# Patient Record
Sex: FEMALE | Race: ASIAN | Employment: FULL TIME | URBAN - METROPOLITAN AREA
[De-identification: names, ages, dates, MRNs, and addresses within clinical notes are randomized per-mention and may not be internally consistent; named-entity substitution may affect disease eponyms.]

---

## 2017-03-13 ENCOUNTER — HOSPITAL ENCOUNTER (OUTPATIENT)
Dept: LAB | Age: 42
Discharge: HOME | End: 2017-03-13
Payer: COMMERCIAL

## 2017-03-13 ENCOUNTER — OFFICE VISIT (OUTPATIENT)
Dept: ONCOLOGY | Age: 42
End: 2017-03-13

## 2017-03-13 VITALS
SYSTOLIC BLOOD PRESSURE: 137 MMHG | OXYGEN SATURATION: 95 % | TEMPERATURE: 97.1 F | WEIGHT: 133.4 LBS | HEIGHT: 63 IN | HEART RATE: 72 BPM | BODY MASS INDEX: 23.64 KG/M2 | DIASTOLIC BLOOD PRESSURE: 87 MMHG | RESPIRATION RATE: 16 BRPM

## 2017-03-13 DIAGNOSIS — R39.15 URINARY URGENCY: ICD-10-CM

## 2017-03-13 DIAGNOSIS — R10.2 PELVIC PRESSURE IN FEMALE: ICD-10-CM

## 2017-03-13 DIAGNOSIS — C53.9: Primary | ICD-10-CM

## 2017-03-13 LAB
BILIRUB UR QL STRIP: NEGATIVE
GLUCOSE UR-MCNC: NEGATIVE MG/DL
KETONES P FAST UR STRIP-MCNC: NEGATIVE MG/DL
PH UR STRIP: 7 [PH] (ref 4.6–8)
PROT UR QL STRIP: NEGATIVE MG/DL
SP GR UR STRIP: 1.02 (ref 1–1.03)
UA UROBILINOGEN AMB POC: NORMAL (ref 0.2–1)
URINALYSIS CLARITY POC: CLEAR
URINALYSIS COLOR POC: YELLOW
URINE BLOOD POC: NEGATIVE
URINE LEUKOCYTES POC: NEGATIVE
URINE NITRITES POC: NEGATIVE

## 2017-03-13 PROCEDURE — 88175 CYTOPATH C/V AUTO FLUID REDO: CPT | Performed by: OBSTETRICS & GYNECOLOGY

## 2017-03-13 NOTE — PROGRESS NOTES
[facility]  [facility], [address], [address] [facility]   [phone]  Nidia Garcia MD      Patient ID:  Name: Jaime Cassidy  MRN: [id number]  : 1975/41 y.o. Visit date: 3/13/2017    INTERVAL HISTORY: INTERVAL HISTORY: Jaime Cassidy is a  female with a history of cervical cancer diagnosed 2011. Her previous procedures include radical hysterectomy including lymph node dissection and pathology has shown Cervical Cancer Stage 1B. She is being seen today for a 6 month followup. Last pap 3/11/16 and was satis and neg. Last PET/CT 12. Patient states that when she urinates she feels like she's not completley emptying. She noticed these symptoms approximately one month ago. Patient is having normal bowel movements with no vaginal bleeding or discharge. Mammogram        COMPREHENSIVE ROS:        PMH:  Past Medical History:   Diagnosis Date    Adenocarcinoma of cervix, stage 1 ([address]     Depression     HPV in female      PSH:  Past Surgical History:   Procedure Laterality Date    HX HYSTERECTOMY  11    radical for stage 1-B Grade I adenocarcinoma of cervix     SOC:  Social History     Social History    Marital status:      Spouse name: N/A    Number of children: N/A    Years of education: N/A     Occupational History    Not on file.      Social History Main Topics    Smoking status: Never Smoker    Smokeless tobacco: Not on file    Alcohol use 0.0 oz/week      Comment: weekly    Drug use: No    Sexual activity: Yes     Birth control/ protection: None     Other Topics Concern    Not on file     Social History Narrative     Family History  Family History   Problem Relation Age of Onset    Stroke Maternal Grandmother     Diabetes Paternal Grandmother     Stroke Paternal Grandmother     Heart Disease Paternal Grandfather      Medications:  Current Outpatient Prescriptions on File Prior to Visit   Medication Sig Dispense Refill    lamoTRIgine (LAMICTAL) 100 mg tablet Take  by mouth daily.  vilazodone (VIIBRYD) 40 mg Tab tablet Take 40 mg by mouth daily.  melatonin (MELATONIN) 5 mg cap capsule Take 5 mg by mouth nightly. No current facility-administered medications on file prior to visit. Allergies   Allergen Reactions    Amoxicillin Rash    Penicillins Rash         OBJECTIVE:  PHYSICAL EXAM  VITAL SIGNS: Visit Vitals    /87 (BP 1 Location: Right arm, BP Patient Position: Sitting)    Pulse 72    Temp 97.1 °F (36.2 °C) (Oral)    Resp 16    Ht 5' 3\" (1.6 m)    Wt 60.5 kg (133 lb 6.4 oz)    SpO2 95%    BMI 23.63 kg/m2      GENERAL HARESH: in no apparent distress and well developed and well nourished   HEENT: NCAT,EOMI,PERRL   RESPIRATORY: lungs clear to auscultation, no wheezing, rhonchi, or crackles   CARDIOVASC: Regular rate and rhythm or without murmur or extra heart sounds   MUSCULOSKEL: no joint tenderness, deformity or swelling   INTEGUMENT:  warm and dry, no rashes or lesions   EXTREMITIES: extremities normal, atraumatic, no cyanosis or edema   PELVIC: External genitalia: normal general appearance  PVR <25cc  Vaginal: normal without tenderness, induration or masses, normal rugae and discharge  Cervix: removed surgically, thin prep PAP obtained  Adnexa: non palpable  Uterus: removed surgically   RECTAL: External hemorrhoids visible   WENCESLAO SURVEY: Cervical, supraclavicular, axillary and inguinal nodes normal.   NEURO: Grossly normal         IMPRESSION AND PLAN:  Stage 1B Cervical Cancer, MARCIAL  Cytology and pelvic performed today. Pelvic pressure and urinary urgency. UA negative today. Patient describes sensation as still having a full bladder after emptying. Post void residual performed today and is less than 25cc. Will order a CT of the abd/pelvis. Patient to follow up to review results.  If this does not show etiology of symptoms will refer patient to  [facility] for Urodynamics, evaluation and treatment.        Gely Michaud MD   Gynecologic Oncology  4/03/04722:35 AM

## 2017-04-12 ENCOUNTER — HOSPITAL ENCOUNTER (OUTPATIENT)
Dept: CT IMAGING | Age: 42
Discharge: HOME | End: 2017-04-12
Attending: OBSTETRICS & GYNECOLOGY
Payer: COMMERCIAL

## 2017-04-12 DIAGNOSIS — R39.15 URINARY URGENCY: ICD-10-CM

## 2017-04-12 DIAGNOSIS — R10.2 PELVIC PRESSURE IN FEMALE: ICD-10-CM

## 2017-04-12 DIAGNOSIS — C53.9: ICD-10-CM

## 2017-04-12 PROCEDURE — 74011636320 HC RX REV CODE- 636/320: Performed by: OBSTETRICS & GYNECOLOGY

## 2017-04-12 PROCEDURE — 74177 CT ABD & PELVIS W/CONTRAST: CPT

## 2017-04-12 PROCEDURE — 74011000255 HC RX REV CODE- 255: Performed by: OBSTETRICS & GYNECOLOGY

## 2017-04-12 RX ORDER — BARIUM SULFATE 20 MG/ML
900 SUSPENSION ORAL
Status: COMPLETED | OUTPATIENT
Start: 2017-04-12 | End: 2017-04-12

## 2017-04-12 RX ADMIN — IOPAMIDOL 100 ML: 612 INJECTION, SOLUTION INTRAVENOUS at 09:10

## 2017-04-12 RX ADMIN — BARIUM SULFATE 900 ML: 21 SUSPENSION ORAL at 09:10

## 2017-04-17 ENCOUNTER — OFFICE VISIT (OUTPATIENT)
Dept: ONCOLOGY | Age: 42
End: 2017-04-17

## 2017-04-17 VITALS
TEMPERATURE: 98 F | DIASTOLIC BLOOD PRESSURE: 74 MMHG | OXYGEN SATURATION: 96 % | BODY MASS INDEX: 23.56 KG/M2 | HEART RATE: 77 BPM | SYSTOLIC BLOOD PRESSURE: 146 MMHG | WEIGHT: 133 LBS | RESPIRATION RATE: 18 BRPM

## 2017-04-17 DIAGNOSIS — C53.9: ICD-10-CM

## 2017-04-17 DIAGNOSIS — N83.202 CYST OF LEFT OVARY: ICD-10-CM

## 2017-04-17 DIAGNOSIS — R39.9 LOWER URINARY TRACT SYMPTOMS: ICD-10-CM

## 2017-04-17 DIAGNOSIS — R16.0 LIVER MASSES: Primary | ICD-10-CM

## 2017-04-17 NOTE — PROGRESS NOTES
[facility]  [address] [place] [address], [address], [address] [place] [phone] [phone]  Dakota Mccormick MD      Patient ID:  Name: Florence Flood  MRN: [id number]  : 41 y.o. Visit date: 2017    INTERVAL HISTORY: INTERVAL HISTORY: Florence Flood is a  female with a history of cervical cancer diagnosed 2011. Her previous procedures include radical hysterectomy including lymph node dissection and pathology has shown Cervical Cancer Stage 1B. She is being seen today in follow up for vague abdominal/pelvic symptoms. In the interval a CT was ordered which shows liver masses, indeterminate and a left ovarian cyst with some free fluid. Today patient reports same urinary symptoms. COMPREHENSIVE ROS:  Scanned in media and reviewed by me  : frequent urinary  All other systems reviewed and negative      PMH:  Past Medical History:   Diagnosis Date    Adenocarcinoma of cervix, stage 1 ([address]     Depression     HPV in female      PSH:  Past Surgical History:   Procedure Laterality Date    HX HYSTERECTOMY  11    radical for stage 1-B Grade I adenocarcinoma of cervix     SOC:  Social History     Social History    Marital status:      Spouse name: N/A    Number of children: N/A    Years of education: N/A     Occupational History    Not on file.      Social History Main Topics    Smoking status: Never Smoker    Smokeless tobacco: Not on file    Alcohol use 0.0 oz/week      Comment: weekly    Drug use: No    Sexual activity: Yes     Birth control/ protection: None     Other Topics Concern    Not on file     Social History Narrative     Family History  Family History   Problem Relation Age of Onset    Stroke Maternal Grandmother     Diabetes Paternal Grandmother     Stroke Paternal Grandmother     Heart Disease Paternal Grandfather      Medications:  Current Outpatient Prescriptions on File Prior to Visit   Medication Sig Dispense Refill    lamoTRIgine (LAMICTAL) 100 mg tablet Take  by mouth daily.  melatonin (MELATONIN) 5 mg cap capsule Take 5 mg by mouth nightly.  vilazodone (VIIBRYD) 40 mg Tab tablet Take 40 mg by mouth daily. No current facility-administered medications on file prior to visit. Allergies   Allergen Reactions    Amoxicillin Rash    Penicillins Rash         OBJECTIVE:  PHYSICAL EXAM  VITAL SIGNS: Visit Vitals    /74    Pulse 77    Temp 98 °F (36.7 °C) (Oral)    Resp 18    Wt 60.3 kg (133 lb)    SpO2 96%    BMI 23.56 kg/m2      GENERAL HARESH: in no apparent distress and well developed and well nourished   HEENT: NCAT,EOMI,PERRL   RESPIRATORY: lungs clear to auscultation, no wheezing, rhonchi, or crackles   CARDIOVASC: Regular rate and rhythm or without murmur or extra heart sounds   MUSCULOSKEL: no joint tenderness, deformity or swelling   INTEGUMENT:  warm and dry, no rashes or lesions   EXTREMITIES: extremities normal, atraumatic, no cyanosis or edema   PELVIC: External genitalia: normal general appearance  PVR <25cc  Vaginal: normal without tenderness, induration or masses, normal rugae and discharge  Cervix: removed surgically, thin prep PAP obtained  Adnexa: non palpable  Uterus: removed surgically   RECTAL: External hemorrhoids visible   WENCESLAO SURVEY: Cervical, supraclavicular, axillary and inguinal nodes normal.   NEURO: Grossly normal         IMPRESSION AND PLAN:  Stage 1B Cervical Cancer, MARCIAL  Reviewed CT in detail with patient today. Liver lesions. Most likely benign hemangiomas. They were not present on prior PET/CT. MRI ordered to better characterize. Patient to follow up after MRI to review. Left ovarian cyst. May be contributing to urinary symptoms. Recommend TVUS in one month to further evaluate. Pelvic pressure and urinary urgency. UA negative today. Patient describes sensation as still having a full bladder after emptying.  Post void residual performed last vist was less than 25cc. If symptoms have not resolved by f/u after TVUS  will refer patient to Dr. Florence Pinon for Urodynamics, evaluation and treatment.        Santhosh Cazares MD   Gynecologic Oncology  6/39/79163:73 AM

## 2017-04-17 NOTE — MR AVS SNAPSHOT
Visit Information Date & Time Provider Department Dept. Phone Encounter #  
 4/17/2017 11:30 AM [facility] [phone] Your Appointments 5/3/2017 10:45 AM  
RESULTS/REVIEW with MD [facility]) Appt Note: RESULTS  
 [address] [place] [address] [facility]  
  
   
 [address] [place] [address]  
  
    
 3/14/2018  9:00 AM  
ANNUAL with MD [facility]) Appt Note: YEARLY  
 [address] [facility] [address] [place] [address] [facility]  
  
   
 2018 [facility] Upcoming Health Maintenance Date Due Pneumococcal 19-64 Highest Risk (1 of 3 - PCV13) 11/25/1994 DTaP/Tdap/Td series (1 - Tdap) 11/25/1996 INFLUENZA AGE 9 TO ADULT 8/1/2016 PAP AKA CERVICAL CYTOLOGY 3/13/2020 Allergies as of 4/17/2017  Review Complete On: 4/17/2017 By: Melly Espinosa MD  
  
 Severity Noted Reaction Type Reactions Amoxicillin    Rash Penicillins    Rash Current Immunizations  Never Reviewed No immunizations on file. Not reviewed this visit You Were Diagnosed With   
  
 Codes Comments Liver masses    -  Primary ICD-10-CM: R16.0 ICD-9-CM: 573.9 Adenocarcinoma of cervix, stage 1 (HCC)     ICD-10-CM: C53.9 ICD-9-CM: 180.9 Vitals BP Pulse Temp Resp Weight(growth percentile) SpO2  
 146/74 77 98 °F (36.7 °C) (Oral) 18 133 lb (60.3 kg) 96% BMI OB Status Smoking Status 23.56 kg/m2 Hysterectomy Never Smoker BMI and BSA Data Body Mass Index Body Surface Area  
 23.56 kg/m 2 1.64 m 2 Preferred Pharmacy Pharmacy Name Phone [id number], [address] [place] [address] [phone] Your Updated Medication List  
  
   
This list is accurate as of: 4/17/17 12:15 PM.  Always use your most recent med list.  
  
  
  
  
 lamoTRIgine 100 mg tablet Commonly known as: LaMICtal  
Take  by mouth daily. melatonin 5 mg Cap capsule Take 5 mg by mouth nightly. VIIBRYD 40 mg Tab tablet Generic drug:  vilazodone Take 40 mg by mouth daily. To-Do List   
 04/17/2017 Imaging:  MRI ABD W CONT Introducing [facility] HE[facility] SERVICES! [facility] introduces Jiberish patient portal. Now you can access parts of your medical record, email your doctor's office, and request medication refills online. 1. In your internet browser, go to [email or web address] 2. Click on the First Time User? Click Here link in the Sign In box. You will see the New Member Sign Up page. 3. Enter your Jiberish Access Code exactly as it appears below. You will not need to use this code after youve completed the sign-up process. If you do not sign up before the expiration date, you must request a new code. · Jiberish Access Code: [email or web address] Expires: 6/11/2017 10:15 AM 
 
4. Enter the last four digits of your Social Security Number (xxxx) and Date of Birth (mm/dd/yyyy) as indicated and click Submit. You will be taken to the next sign-up page. 5. Create a Jiberish ID. This will be your Jiberish login ID and cannot be changed, so think of one that is secure and easy to remember. 6. Create a Jiberish password. You can change your password at any time. 7. Enter your Password Reset Question and Answer. This can be used at a later time if you forget your password. 8. Enter your e-mail address. You will receive e-mail notification when new information is available in [address] 9. Click Sign Up. You can now view and download portions of your medical record. 10. Click the Download Summary menu link to download a portable copy of your medical information. If you have questions, please visit the Frequently Asked Questions section of the [email or web address] website. Remember, [employer] is NOT to be used for urgent needs. For medical emergencies, dial 911. Now available from your iPhone and Android! Please provide this summary of care documentation to your next provider. Your primary care clinician is listed as Yasmin Day. If you have any questions after today's visit, please call [phone].

## 2017-04-21 ENCOUNTER — HOSPITAL ENCOUNTER (OUTPATIENT)
Dept: MRI IMAGING | Age: 42
Discharge: HOME | End: 2017-04-21
Attending: OBSTETRICS & GYNECOLOGY
Payer: COMMERCIAL

## 2017-04-21 DIAGNOSIS — R16.0 LIVER MASSES: ICD-10-CM

## 2017-04-21 DIAGNOSIS — R16.0 LIVER MASSES: Primary | ICD-10-CM

## 2017-04-21 DIAGNOSIS — C53.9: ICD-10-CM

## 2017-04-21 PROCEDURE — 77030021566 MRI ABD W WO CONT

## 2017-04-21 PROCEDURE — A9579 GAD-BASE MR CONTRAST NOS,1ML: HCPCS | Performed by: OBSTETRICS & GYNECOLOGY

## 2017-04-21 PROCEDURE — 74011636320 HC RX REV CODE- 636/320: Performed by: OBSTETRICS & GYNECOLOGY

## 2017-04-21 RX ADMIN — GADOPENTETATE DIMEGLUMINE 20 ML: 469.01 INJECTION INTRAVENOUS at 13:09

## 2017-04-26 ENCOUNTER — OFFICE VISIT (OUTPATIENT)
Dept: ONCOLOGY | Age: 42
End: 2017-04-26

## 2017-04-26 VITALS
RESPIRATION RATE: 18 BRPM | BODY MASS INDEX: 23.56 KG/M2 | DIASTOLIC BLOOD PRESSURE: 82 MMHG | HEART RATE: 92 BPM | OXYGEN SATURATION: 98 % | WEIGHT: 133 LBS | TEMPERATURE: 98.4 F | SYSTOLIC BLOOD PRESSURE: 158 MMHG

## 2017-04-26 DIAGNOSIS — R39.9 LOWER URINARY TRACT SYMPTOMS: ICD-10-CM

## 2017-04-26 DIAGNOSIS — K76.89 FOCAL NODULAR HYPERPLASIA OF LIVER: ICD-10-CM

## 2017-04-26 DIAGNOSIS — N83.202 CYST OF LEFT OVARY: ICD-10-CM

## 2017-04-26 DIAGNOSIS — D18.03 HEPATIC HEMANGIOMA: Primary | ICD-10-CM

## 2017-04-26 NOTE — PROGRESS NOTES
[facility]  [address] [place] [address], [address], [address] [place] [phone] [phone]  Rod Issa MD      Patient ID:  Name: Carlos Phan  MRN: [id number]  : 41 y.o. Visit date: 2017    INTERVAL HISTORY: INTERVAL HISTORY: Carlos Phan is a  female with a history of cervical cancer diagnosed 2011. Her previous procedures include radical hysterectomy including lymph node dissection and pathology has shown Cervical Cancer Stage 1B. She is being seen today to review recent MRI which was ordered to further evaluate liver lesions identified on CT scan. [id number]  Study Result   EXAM: MRI abdomen with and without contrast.     INDICATION: Indeterminate liver lesion seen on prior CT. History of cervical  cancer.     COMPARISON: 2017     TECHNIQUE: Precontrast and dynamic postcontrast MR imaging of the abdomen was  performed.     _______________     FINDINGS:     LOWER CHEST: Unremarkable.     LIVER: Normal liver contour and signal. There are several small lesions  identified.  -Subcapsular medial left lobe (series 1201 image 49) measuring 1.4 cm. This is  mildly T2 bright, T1 dark, diffusely hyperenhancing the arterial phase, with  subsequent fade.  -Posterior right hepatic lobe (image 36) measuring 1.0 cm. This is very T2  bright, T1 dark, hyperenhancing in the arterial phase and subsequently fades.  -Posterior right hepatic lobe (image 51) measuring 0.9 cm, same features as the  above.     BILIARY: No ductal dilation or irregularity.  Gallbladder is unremarkable.     SPLEEN: Normal.     PANCREAS: Normal.     ADRENALS: Normal.     KIDNEYS: Normal.     LYMPH NODES: No enlarged lymph nodes.     GASTROINTESTINAL TRACT: No bowel dilation or wall thickening.     VASCULATURE: Unremarkable.     BONES: No acute or aggressive osseous abnormalities identified.     OTHER: None.     IMPRESSION:     Several hyperenhancing lesions in the right hepatic lobe as above. Lesions which  are very T2 bright are most likely flash filling hemangiomas, the other larger  lesion has only mild T2 hyperintensity with enhancement features therefore  favoring FNH (these 2 entities are frequently seen together). Metastatic disease  is considered unlikely with these signal features. Six-month follow-up MRI could  be considered to ensure size stability, if stable on follow-up it is reasonable  to assume these are benign. COMPREHENSIVE ROS:  All systems have been reviewed by me and are scanned in media. PMH:  Past Medical History:   Diagnosis Date    [address]en[facility] of cervix, stage 1 ([facility]     Depression     HPV in female      PSH:  Past Surgical History:   Procedure Laterality Date    HX HYSTERECTOMY  7/29/11    radical for stage 1-B Grade I adenocarcinoma of cervix     SOC:  Social History     Social History    Marital status:      Spouse name: N/A    Number of children: N/A    Years of education: N/A     Occupational History    Not on file. Social History Main Topics    Smoking status: Never Smoker    Smokeless tobacco: Not on file    Alcohol use 0.0 oz/week      Comment: weekly    Drug use: No    Sexual activity: Yes     Birth control/ protection: None     Other Topics Concern    Not on file     Social History Narrative     Family History  Family History   Problem Relation Age of Onset    Stroke Maternal Grandmother     Diabetes Paternal Grandmother     Stroke Paternal Grandmother     Heart Disease Paternal Grandfather      Medications:  Current Outpatient Prescriptions on File Prior to Visit   Medication Sig Dispense Refill    lamoTRIgine (LAMICTAL) 100 mg tablet Take  by mouth daily.  melatonin (MELATONIN) 5 mg cap capsule Take 5 mg by mouth nightly.  vilazodone (VIIBRYD) 40 mg Tab tablet Take 40 mg by mouth daily. No current facility-administered medications on file prior to visit. Allergies   Allergen Reactions    Amoxicillin Rash    Penicillins Rash         OBJECTIVE:  PHYSICAL EXAM  VITAL SIGNS: Visit Vitals    /82    Pulse 92    Temp 98.4 °F (36.9 °C) (Oral)    Resp 18    Wt 60.3 kg (133 lb)    SpO2 98%    BMI 23.56 kg/m2      GENERAL HARESH: in no apparent distress and well developed and well nourished   HEENT: NCAT,EOMI,PERRL   RESPIRATORY: lungs clear to auscultation, no wheezing, rhonchi, or crackles   CARDIOVASC: Regular rate and rhythm or without murmur or extra heart sounds   MUSCULOSKEL: no joint tenderness, deformity or swelling   INTEGUMENT:  warm and dry, no rashes or lesions   EXTREMITIES: extremities normal, atraumatic, no cyanosis or edema   PELVIC: External genitalia: normal general appearance  PVR <25cc  Vaginal: normal without tenderness, induration or masses, normal rugae and discharge  Cervix: removed surgically, thin prep PAP obtained  Adnexa: non palpable  Uterus: removed surgically   RECTAL: External hemorrhoids visible   WENCESLAO SURVEY: Cervical, supraclavicular, axillary and inguinal nodes normal.   NEURO: Grossly normal         IMPRESSION AND PLAN:  Stage 1B Cervical Cancer, MARCIAL  Reviewed MRI in detail with patient today. Liver lesions consistent with Benign hemangiomas and focal nodular hyperplasia. I explained each of these diagnoses and also gave patient written education material about these findings. I am recommending a repeat MRI in 6 months to confirm that these lesions are stable. Order has been placed for this MRI. Patient to follow up after next MRI. Left ovarian cyst identified on CT 4/12/2017. TVUS is scheduled 5/18/2017 to further evaluate. Patient to call NP for results or to follow up after TVUS   Pelvic pressure and urinary urgency. UA negative. Patient describes sensation as still having a full bladder after emptying. Post void residual was less than 25cc.  If symptoms have not resolved by f/u after TVUS  will refer patient to Dr. Devon Iniguez for Urodynamics, evaluation and treatment.        Kika Sanchez MD   Gynecologic Oncology  2/53/51114:27 AM

## 2017-04-26 NOTE — MR AVS SNAPSHOT
Visit Information Date & Time Provider Department Dept. Phone Encounter #  
 4/26/2017 10:30 AM [address], [place] [facility] [phone] [id number] Your Appointments 3/14/2018  9:00 AM  
ANNUAL with [address], MD  
[facility]) Appt Note: YEARLY  
 [address] [address] Upcoming Health Maintenance Date Due Pneumococcal 19-64 Highest Risk (1 of 3 - PCV13) 11/25/1994 DTaP/Tdap/Td series (1 - Tdap) 11/25/1996 INFLUENZA AGE 9 TO ADULT 8/1/2016 PAP AKA CERVICAL CYTOLOGY 3/13/2020 Allergies as of 4/26/2017  Review Complete On: 4/26/2017 By: Iain Lagunas LPN Severity Noted Reaction Type Reactions Amoxicillin    Rash Penicillins    Rash Current Immunizations  Never Reviewed No immunizations on file. Not reviewed this visit Vitals BP Pulse Temp Resp Weight(growth percentile) SpO2  
 158/82 92 98.4 °F (36.9 °C) (Oral) 18 133 lb (60.3 kg) 98% BMI OB Status Smoking Status 23.56 kg/m2 Hysterectomy Never Smoker BMI and BSA Data Body Mass Index Body Surface Area  
 23.56 kg/m 2 1.64 m 2 Preferred Pharmacy Pharmacy Name Phone [id number] [phone] Your Updated Medication List  
  
   
This list is accurate as of: 4/26/17 11:45 AM.  Always use your most recent med list.  
  
  
  
  
 lamoTRIgine 100 mg tablet Commonly known as: LaMICtal  
Take  by mouth daily. melatonin 5 mg Cap capsule Take 5 mg by mouth nightly. VIIBRYD 40 mg Tab tablet Generic drug:  vilazodone Take 40 mg by mouth daily. To-Do List   
 05/18/2017 12:30 PM  
  Appointment with [facility] [place] [address] at . Beverly Huang ([phone]) OUTSIDE FILMS  - Any outside films related to the study being scheduled should be brought with you on the day of the exam.  If this cannot be done there may be a delay in the reading of the study. MEDICATIONS  - Patient must bring a complete list of all medications currently taking to include prescriptions, over-the-counter meds, herbals, vitamins & any dietary supplements  GENERAL -Patient must drink 32 ounces of water 1 hour prior to the exam.  
  
  
Introducing [facility] HE[facility]! Dear Shorty Lao: Thank you for requesting a [employer]ometry account. Our records indicate that you already have an active Datometry account. You can access your account anytime at [email or web address] Did you know that you can access your hospital and ER discharge instructions at any time in Datometry? You can also review all of your test results from your hospital stay or ER visit. Additional Information If you have questions, please visit the Frequently Asked Questions section of the Datometry website at [email or web address] Remember, Datometry is NOT to be used for urgent needs. For medical emergencies, dial 911. Now available from your iPhone and Android! Please provide this summary of care documentation to your next provider. Your primary care clinician is listed as Beau Lino. If you have any questions after today's visit, please call [phone].

## 2017-05-02 ENCOUNTER — TELEPHONE (OUTPATIENT)
Dept: ONCOLOGY | Age: 42
End: 2017-05-02

## 2018-03-05 ENCOUNTER — TELEPHONE (OUTPATIENT)
Dept: ONCOLOGY | Age: 43
End: 2018-03-05

## 2018-03-05 NOTE — TELEPHONE ENCOUNTER
Left voicemail for patient to call to reschedule her appointment on 3/14/18, Dr. Anabell Mcelroy is no longer with the office.

## 2018-05-16 ENCOUNTER — HOSPITAL ENCOUNTER (OUTPATIENT)
Dept: CT IMAGING | Age: 43
Discharge: HOME | End: 2018-05-16
Attending: OBSTETRICS & GYNECOLOGY
Payer: COMMERCIAL

## 2018-05-16 DIAGNOSIS — C22.0 LIVER CELL CARCINOMA (HCC): ICD-10-CM

## 2018-05-16 DIAGNOSIS — R19.04 LEFT LOWER QUADRANT ABDOMINAL SWELLING, MASS AND LUMP: ICD-10-CM

## 2018-05-16 DIAGNOSIS — N83.202 UNSPECIFIED OVARIAN CYST, LEFT SIDE: ICD-10-CM

## 2018-05-16 LAB — CREAT UR-MCNC: 0.7 MG/DL (ref 0.6–1.3)

## 2018-05-16 PROCEDURE — 74011000255 HC RX REV CODE- 255: Performed by: OBSTETRICS & GYNECOLOGY

## 2018-05-16 PROCEDURE — 74177 CT ABD & PELVIS W/CONTRAST: CPT

## 2018-05-16 PROCEDURE — 82565 ASSAY OF CREATININE: CPT

## 2018-05-16 PROCEDURE — 74011636320 HC RX REV CODE- 636/320: Performed by: OBSTETRICS & GYNECOLOGY

## 2018-05-16 RX ORDER — BARIUM SULFATE 20 MG/ML
900 SUSPENSION ORAL
Status: COMPLETED | OUTPATIENT
Start: 2018-05-16 | End: 2018-05-16

## 2018-05-16 RX ADMIN — IOPAMIDOL 100 ML: 612 INJECTION, SOLUTION INTRAVENOUS at 08:02

## 2018-05-16 RX ADMIN — BARIUM SULFATE 900 ML: 20 SUSPENSION ORAL at 08:01

## 2019-12-02 ENCOUNTER — TELEPHONE (OUTPATIENT)
Dept: ONCOLOGY | Age: 44
End: 2019-12-02

## 2019-12-02 NOTE — TELEPHONE ENCOUNTER
Left voicemail for patient to call to schedule new patient appointment per referral from Dr. Anthony Mosquera.

## 2020-05-19 NOTE — PROGRESS NOTES
[address], [address], 4053 [facility]  5409 [address] [facility], 5 [facility], [address] [place] [address]   [phone]  Santo Garvin DO      Patient ID:  Name:  Nancy Jeong  MRN:  [id number]  :  1975/44 y.o. Date:  2020      HISTORY OF PRESENT ILLNESS:  Nancy Jeong is a 40 y.o.   postmenopausal female referred by Dr. Angel Soler for cervical cancer surviellence. S/P Radical hysterectomy including lymph node dissection with pathology findings of Stage 1B cervical cancer. She has been on surveillance visits with her most recent visit 10/02/2019, pap ASCUS with plan to follow up in 6 months. Here today to establish care without complaints. Denies Vaginal bleeding, change in vaginal discharge, new abdominopelvic pain, change in bladder/bowel habits      Labs:  10/03/2019 PAP: ASCUS,   2018 PAP: NIL    Imaging  2018 CT ABD PELV W CONT  FINDINGS:     LOWER CHEST: Unremarkable.     LIVER, BILIARY: 2 enhancing lesions in the posterior right hepatic lobe  measuring 8 mm on axial image 20 and approximate 13 mm more inferiorly on axial  image 31 are without significant interval change. Additional vague enhancing  subcapsular lesion in the medial hepatic lobe is also similar to the prior study  and corresponds to the enhancing lesions described on the prior MRI. No biliary  dilation. Gallbladder is unremarkable.     PANCREAS: Normal.     SPLEEN: Normal.     ADRENALS: Normal.     KIDNEYS: Normal.     LYMPH NODES: No enlarged lymph nodes.     GASTROINTESTINAL TRACT: No bowel dilation or wall thickening.     PELVIC ORGANS: Status post hysterectomy. .     VASCULATURE: Unremarkable.     BONES: No acute or aggressive osseous abnormalities identified. Mild levoconvex  scoliosis.     OTHER: None.     IMPRESSION:     1. 3 small enhancing lesions in the liver are similar to the prior study and  corresponds to the lesions described on the MRI. Given the overall stability,  benign hemangiomas and/or FNH are presumed. Recommend ongoing attention on  follow-up. 2. No convincing evidence of metastatic disease to the abdomen or pelvis.        ROS:   As above      Patient Active Problem List    Diagnosis Date Noted    Low back pain 12/06/2012    Lump in the right groin 12/06/2012    Adenocarcinoma of cervix, stage 1 (HCC)      Past Medical History:   Diagnosis Date    Adenocarcinoma of cervix, stage 1 ([facility] [address]     Depression     HPV in female       Past Surgical History:   Procedure Laterality Date    HX HYSTERECTOMY  7/29/11    radical for stage 1-B Grade I adenocarcinoma of cervix      OB History    No obstetric history on file. Social History     Tobacco Use    Smoking status: Never Smoker    Smokeless tobacco: Never Used   Substance Use Topics    Alcohol use: Yes     Alcohol/week: 2.0 standard drinks     Types: 2 Glasses of wine per week     Comment: weekly      Family History   Problem Relation Age of Onset    Stroke Maternal Grandmother     Diabetes Paternal Grandmother     Stroke Paternal Grandmother     Heart Disease Paternal Grandfather       Current Outpatient Medications   Medication Sig    OXcarbazepine (TRILEPTAL) 300 mg tablet TAKE 1.5 TABLET BY ORAL ROUTE AT BEDTIME    DULoxetine (CYMBALTA) 60 mg capsule TAKE 1 CAPSULE BY MOUTH EVERY DAY    multivitamin (ONE A DAY) tablet Take 1 Tab by mouth daily.  melatonin 5 mg cap capsule Take 5 mg by mouth nightly.  lamoTRIgine (LAMICTAL) 100 mg tablet Take  by mouth daily.  vilazodone (VIIBRYD) 40 mg Tab tablet Take 40 mg by mouth daily. No current facility-administered medications for this visit.       Allergies   Allergen Reactions    Amoxicillin Rash    Penicillins Rash          OBJECTIVE:    Physical Exam  VITAL SIGNS: Visit Vitals  /81 (BP 1 Location: Right arm, BP Patient Position: Sitting)   Pulse 83   Temp 98 °F (36.7 °C) (Oral)   Resp 14   Ht 5' 3\" (1.6 m)   Wt 63.2 kg (139 lb 6.4 oz)   SpO2 98%   BMI 24.69 kg/m²      GENERAL HARESH: in no apparent distress and well developed and well nourished   MUSCULOSKEL: no joint tenderness, deformity or swelling   INTEGUMENT:  warm and dry, no rashes or lesions   ABDOMEN . soft, NT, ND, No masses appreciated   EXTREMITIES: extremities normal, atraumatic, no cyanosis or edema   PELVIC: External genitalia: normal general appearance  Vaginal: normal mucosa without prolapse or lesions  Cervix: removed surgically  Adnexa: removed surgically  Uterus: removed surgically   RECTAL: deferred   WENCESLAO SURVEY: Cervical, supraclavicular, axillary and inguinal nodes normal.   NEURO: Grossly normal         IMPRESSION/PLAN:  1. Stage IB1 Cervical cancer who is clinically MARCIAL   -pap and pelvic performed today   -discussed signs/symptoms of recurrence   -if pap negative will arrange for f/u with primary gyn   -if pap >/= ASCUS will proceed with colposcopy      The total time spent was 60 minutes regarding this patients diagnosis of cervical cancer and >50% of this time was spent counseling and coordinating care    82 [facility]  5/29/20209:28 AM

## 2020-05-29 ENCOUNTER — HOSPITAL ENCOUNTER (OUTPATIENT)
Dept: LAB | Age: 45
Discharge: HOME | End: 2020-05-29
Payer: COMMERCIAL

## 2020-05-29 ENCOUNTER — OFFICE VISIT (OUTPATIENT)
Dept: ONCOLOGY | Age: 45
End: 2020-05-29

## 2020-05-29 VITALS
TEMPERATURE: 98 F | BODY MASS INDEX: 24.7 KG/M2 | HEIGHT: 63 IN | RESPIRATION RATE: 14 BRPM | HEART RATE: 83 BPM | DIASTOLIC BLOOD PRESSURE: 81 MMHG | OXYGEN SATURATION: 98 % | SYSTOLIC BLOOD PRESSURE: 136 MMHG | WEIGHT: 139.4 LBS

## 2020-05-29 DIAGNOSIS — C53.9: Primary | ICD-10-CM

## 2020-05-29 PROCEDURE — 88175 CYTOPATH C/V AUTO FLUID REDO: CPT

## 2020-05-29 PROCEDURE — 87624 HPV HI-RISK TYP POOLED RSLT: CPT

## 2020-05-29 RX ORDER — DULOXETIN HYDROCHLORIDE 60 MG/1
CAPSULE, DELAYED RELEASE ORAL
COMMUNITY
Start: 2020-05-08

## 2020-05-29 RX ORDER — BISMUTH SUBSALICYLATE 262 MG
1 TABLET,CHEWABLE ORAL DAILY
COMMUNITY

## 2020-05-29 RX ORDER — OXCARBAZEPINE 300 MG/1
TABLET, FILM COATED ORAL
COMMUNITY
Start: 2020-05-08

## 2020-05-29 NOTE — PROGRESS NOTES
Griselda Coy is a 40 y.o. female presents in office for new patient exam, transfer of care from Dr. Silva December. Chief Complaint   Patient presents with    Cervical Cancer    New Patient        Do you have any unusual vaginal bleeding, discharge or irritation? No  Do you have any changes in your bowel movements? No  Have you been experiencing nausea or vomiting? No  Have you been experiencing any continuous or worsening abdominal pain? No      Visit Vitals  /81 (BP 1 Location: Right arm, BP Patient Position: Sitting)   Pulse 83   Temp 98 °F (36.7 °C) (Oral)   Resp 14   Ht 5' 3\" (1.6 m)   Wt 63.2 kg (139 lb 6.4 oz)   SpO2 98%   BMI 24.69 kg/m²         Health Maintenance Due   Topic Date Due    DTaP/Tdap/Td series (1 - Tdap) 11/25/1996    Lipid Screen  11/25/2015         1. Have you been to the ER, urgent care clinic since your last visit? Hospitalized since your last visit? No    2. Have you seen or consulted any other health care providers outside of the [address] [place] [address] since your last visit? Include any pap smears or colon screening.  No    3 most recent PHQ Screens 5/29/2020   Little interest or pleasure in doing things Not at all   Feeling down, depressed, irritable, or hopeless Not at all   Total Score PHQ 2 0         Learning Assessment 5/29/2020   PRIMARY LEARNER Patient   BARRIERS PRIMARY LEARNER NONE   CO-LEARNER CAREGIVER No   PRIMARY LANGUAGE ENGLISH   LEARNER PREFERENCE PRIMARY DEMONSTRATION   ANSWERED BY Patient   RELATIONSHIP SELF

## 2020-05-29 NOTE — PATIENT INSTRUCTIONS
Cervical Cancer: Care Instructions Your Care Instructions Cervical cancer occurs when abnormal cells on the cervix grow out of control. These cells may spread to nearby organs, lymph glands, or distant organs. The cervix is the lower part of the uterus that opens into the vagina. This form of cancer can be cured most of the time, especially when found at an early stage. It is usually found at a very early stage through a Pap smear. If the cancer is in an early stage, you may need to have only a small part of the cervix removed. This type of surgery may allow a woman to become pregnant later. In other cases, removal of the cervix and uterus (hysterectomy) may be the better choice. Treatment also may include radiation or chemotherapy. You may get medicines to help with chemotherapy or radiation side effects, such as nausea and vomiting or tiredness. Finding out that you have cancer is scary. You may feel many emotions and may need some help coping. Seek out family, friends, and counselors for support. You also can do things at home to make yourself feel better while you go through treatment. Call the [address] [place], [place] [address]donovan Allen ([phone]) or visit its website at [address] [employer] for more information. Follow-up care is a key part of your treatment and safety. Be sure to make and go to all appointments, and call your doctor if you are having problems. It's also a good idea to know your test results and keep a list of the medicines you take. How can you care for yourself at home? · Take your medicines exactly as prescribed. Call your doctor if you think you are having a problem with your medicine. You may get medicine for nausea and vomiting if you have these side effects. · Eat healthy food. If you do not feel like eating, try to eat food that has protein and extra calories to keep up your strength and prevent weight loss. Drink liquid meal replacements for extra calories and protein.  Try to eat your main meal early. · Get some physical activity every day, but do not get too tired. Keep doing the hobbies you enjoy as your energy allows. · Take steps to control your stress and workload. Learn relaxation techniques. ? Share your feelings. Stress and tension affect our emotions. By expressing your feelings to others, you may be able to understand and cope with them. ? Consider joining a support group. Talking about a problem with your spouse, a good friend, or other people with similar problems is a good way to reduce tension and stress. ? Express yourself through art. Try writing, dance, art, or crafts to relieve tension. Some dance, writing, or art groups may be available just for people who have cancer. ? Be kind to your body and mind. Getting enough sleep, eating a healthy diet, and taking time to do things you enjoy can contribute to an overall feeling of balance in your life and help reduce stress. ? Get help if you need it. Discuss your concerns with your doctor or counselor. · If you are vomiting or have diarrhea: ? Drink plenty of fluids (enough so that your urine is light yellow or clear like water) to prevent dehydration. Choose water and other caffeine-free clear liquids. If you have kidney, heart, or liver disease and have to limit fluids, talk with your doctor before you increase the amount of fluids you drink. ? When you are able to eat, try clear soups, mild foods, and liquids until all symptoms are gone for 12 to 48 hours. Other good choices include dry toast, crackers, cooked cereal, and gelatin dessert, such as Jell-O. 
? Take care of your urinary tract to prevent problems such as infection, which can be caused by cervical cancer and its treatment. Limit drinks with caffeine, drink plenty of fluids, and urinate every 3 or 4 hours. · If you have not already done so, prepare a list of advance directives.  Advance directives are instructions to your doctor and family members about what kind of care you want if you become unable to speak or express yourself. When should you call for help? [id number] anytime you think you may need emergency care. For example, call if: 
· You passed out (lost consciousness). Call your doctor now or seek immediate medical care if: 
· You have a fever. · You have abnormal bleeding. · You think you have an infection. · You have new or worse pain. · You have new symptoms, such as a cough, belly pain, vomiting, diarrhea, or a rash. Watch closely for changes in your health, and be sure to contact your doctor if: 
· You are much more tired than usual. 
· You have swollen glands in your armpits, groin, or neck. · You do not get better as expected. Where can you learn more? Go to http://esperanza-cristian.info/ Enter F505 in the search box to learn more about \"Cervical Cancer: Care Instructions. \" Current as of: August 22, 2019               Content Version: 12.5 © 2517-7550 Healthwise[employer]. Care instructions adapted under license by [employer] (which disclaims liability or warranty for this information). If you have questions about a medical condition or this instruction, always ask your healthcare professional. [address] any warranty or liability for your use of this information.

## 2020-05-29 NOTE — LETTER
5/29/20 Patient: Yadira Tello YOB: 1975 Date of Visit: 5/29/2020 Gage Villa, [address] [facility] A [address] [place] [address] VIA Facsimile: [phone] Dear Gage Villa MD, Thank you [facility]izzy [facility]. My notes for this consultation are attached. If you have questions, please do not hesitate to call me. I look forward to following your patient along with you. Sincerely, Randee Clemons MD

## 2020-06-02 ENCOUNTER — TELEPHONE (OUTPATIENT)
Dept: ONCOLOGY | Age: 45
End: 2020-06-02

## 2023-04-17 NOTE — MR AVS SNAPSHOT
Visit Information Date & Time Provider Department Dept. Phone Encounter #  
 3/13/2017  9:15 AM MD Rosey [facility] [phone] Your Appointments 3/14/2018  9:00 AM  
ANNUAL with MD [facility]) Appt Note: YEARLY  
 [id number] [address] Upcoming Health Maintenance Date Due Pneumococcal 19-64 Highest Risk (1 of 3 - PCV13) 11/25/1994 DTaP/Tdap/Td series (1 - Tdap) 11/25/1996 INFLUENZA AGE 9 TO ADULT 8/1/2016 PAP AKA CERVICAL CYTOLOGY 3/11/2019 Allergies as of 3/13/2017  Review Complete On: 3/13/2017 By: Gely Michaud MD  
  
 Severity Noted Reaction Type Reactions Amoxicillin    Rash Penicillins    Rash Current Immunizations  Never Reviewed No immunizations on file. Not reviewed this visit You Were Diagnosed With   
  
 Codes Comments Adenocarcinoma of cervix, stage 1 (HCC)    -  Primary ICD-10-CM: C53.9 ICD-9-CM: 180.9 Urinary urgency     ICD-10-CM: R39.15 ICD-9-CM: 350.06 Pelvic pressure in female     ICD-10-CM: N94.89 ICD-9-CM: 625.8 Vitals BP Pulse Temp Resp Height(growth percentile) Weight(growth percentile) 137/87 (BP 1 Location: Right arm, BP Patient Position: Sitting) 72 97.1 °F (36.2 °C) (Oral) 16 5' 3\" (1.6 m) 133 lb 6.4 oz (60.5 kg) SpO2 BMI OB Status Smoking Status 95% 23.63 kg/m2 Hysterectomy Never Smoker Vitals History BMI and BSA Data Body Mass Index Body Surface Area  
 23.63 kg/m 2 1.64 m 2 Preferred Pharmacy Pharmacy Name Phone [id number], [address] [place] [address] [phone] Your Updated Medication List  
  
   
 This list is accurate as of: 3/13/17 11:35 AM.  Always use your most recent med list.  
  
  
  
  
 lamoTRIgine 100 mg tablet Commonly known as: LaMICtal  
Take  by mouth daily. melatonin 5 mg Cap capsule Take 5 mg by mouth nightly. VIIBRYD 40 mg Tab tablet Generic drug:  vilazodone Take 40 mg by mouth daily. We Performed the Following AMB POC URINALYSIS DIP STICK MANUAL W/O MICRO [62370 CPT(R)] To-Do List   
 03/13/2017 Imaging:  CT ABD PELV W CONT Introducing [facility]! Cris Ruiz introduces [employer] patient portal. Now you can access parts of your medical record, email your doctor's office, and request medication refills online. 1. In your internet browser, go to [email or web address] 2. Click on the First Time User? Click Here link in the Sign In box. You will see the New Member Sign Up page. 3. Enter your [employer] Access Code exactly as it appears below. You will not need to use this code after youve completed the sign-up process. If you do not sign up before the expiration date, you must request a new code. · [email or web address] Access Code: [email or web address] Expires: 6/11/2017 10:15 AM 
 
4. Enter the last four digits of your Social Security Number (xxxx) and Date of Birth (mm/dd/yyyy) as indicated and click Submit. You will be taken to the next sign-up page. 5. Create a c8apps ID. This will be your c8apps login ID and cannot be changed, so think of one that is secure and easy to remember. 6. Create a [employer]apps password. You can change your password at any time. 7. Enter your Password Reset Question and Answer. This can be used at a later time if you forget your password. 8. Enter your e-mail address. You will receive e-mail notification when new information is available in [address] 9. Click Sign Up. You can now view and download portions of your medical record. 10. Click the Download Summary menu link to download a portable copy of your medical information. If you have questions, please visit the Frequently Asked Questions section of the [employer] website. Remember, [employer] is NOT to be used for urgent needs. For medical emergencies, dial 911. Now available from your iPhone and Android! Please provide this summary of care documentation to your next provider. Your primary care clinician is listed as Domingo Murray. If you have any questions after today's visit, please call [phone]. Bessy Bender; MPH)  Surgery  Phys Medicine  [facility]  [address] [place] [address]  Phone: [phone]  Fax: [phone]  Follow Up Time: 1 month    Karl Hays)  Hematology; Internal Medicine; Medical Oncology  [address] [place] [address]  Phone: [phone]  Fax: [phone]  Follow Up Time: 1 week    Aamir Hamm)  Urology  [address] [facility], [address]  [place], [place] [id number]  Phone: [phone]  Fax: [phone]  Follow Up Time: 1 week    Nathaniel Galvan)  Surgery; Thoracic Surgery  [address] [place] [address], Oncology Building  3rd [place] [address]  Phone: [phone]  Fax: [phone]  Follow Up Time: 1 week    Toño Crisostomo ()  Critical Care Medicine; Internal Medicine; Pulmonary Disease  [address] [facility], [address]  [place] [address]  Phone: [phone]  Fax: [phone]  Follow Up Time: 1 week